# Patient Record
Sex: FEMALE | Race: WHITE | Employment: OTHER | URBAN - METROPOLITAN AREA
[De-identification: names, ages, dates, MRNs, and addresses within clinical notes are randomized per-mention and may not be internally consistent; named-entity substitution may affect disease eponyms.]

---

## 2018-05-14 ENCOUNTER — HOSPITAL ENCOUNTER (OUTPATIENT)
Dept: PHYSICAL THERAPY | Age: 78
Discharge: HOME | End: 2018-05-14
Payer: MEDICARE

## 2018-05-14 PROCEDURE — G8985 CARRY GOAL STATUS: HCPCS

## 2018-05-14 PROCEDURE — G8984 CARRY CURRENT STATUS: HCPCS

## 2018-05-14 PROCEDURE — 97110 THERAPEUTIC EXERCISES: CPT

## 2018-05-14 PROCEDURE — 97162 PT EVAL MOD COMPLEX 30 MIN: CPT

## 2018-05-14 NOTE — PROGRESS NOTES
PHYSICAL THERAPY - DAILY TREATMENT NOTE    Patient Name: Joseph Monsalve        Date: 2018  : 1940   YES Patient  Verified  Visit #:   1   of   8  Insurance: Payor: [place] MEDICARE / Plan: [place] MEDICARE PART A & B / Product Type: Medicare /      In time: 2:20 Out time: 3:05   Total Treatment Time: 39     Medicare Time Tracking (below)   Total Timed Codes (min):  10 1:1 Treatment Time:  10     TREATMENT AREA =  Right UE    SUBJECTIVE    Pain Level (on 0 to 10 scale):  0  / 10   Medication Changes/New allergies or changes in medical history, any new surgeries or procedures? NO    If yes, update Summary List   Subjective Functional Status/Changes:  []  No changes reported     See Eval          OBJECTIVE      10 min Therapeutic Exercise:  [x]  See flow sheet   Rationale:      increase ROM and increase strength to improve the patients ability to perform AROm without pain      min Patient Education:  YES  Reviewed HEP   []  Progressed/Changed HEP based on:   Issued HEP     Other Objective/Functional Measures:    See Eval     Post Treatment Pain Level (on 0 to 10) scale:   0  / 10     ASSESSMENT    Assessment/Changes in Function:     Justification for Eval Code Complexity: moderate  Patient History (low 0, mod 1-2, high 3-4): multiple Anthony, L/S fusion-high, Mastectomy 2018   Examination (low 1-2, mod 3+, high 4+): Decreased strength, poor posturing, limited activity tolerance  Clinical Presentation (low: stable/uncomplicated; mod: evolving; high: unstable/unpredictable): evolving  Clinical Decision Making (low , mod 26-74, high 1-25): FOTO-moderate         []  See Progress Note/Recertification   Patient will continue to benefit from skilled PT services to analyze,, cue,, progress,, modify,, demonstrate,, instruct, and address, movement patterns,, therapeutic interventions,, postural abnormalities,, soft tissue restrictions,, ROM,, strength, and functional mobility, to attain remaining goals.    Progress toward goals / Updated goals:    Goals set per POC     PLAN    []  Upgrade activities as tolerated YES Continue plan of care   []  Discharge due to :    []  Other:      Therapist: Patrick Portillo DPT    Date: 5/14/2018 Time: 4:12 PM   Future Appointments  Date Time Provider Maribel House   5/17/2018 1:00 PM [facility]   5/21/2018 2:00  [facility]   5/23/2018 1:00 PM [facility]   5/30/2018 2:00 PM [facility]   6/1/2018 1:30 PM [facility]

## 2018-05-14 NOTE — PROGRESS NOTES
[address] PHYSICAL THERAPY  [address] [place] [address] Mario Toribio, [address] - Phone: [phone]  Fax: [phone] / [facility]  Patient Name: Kimmy Ernandez : 1940   Medical   Diagnosis: Pain in right arm [M79.601] Treatment Diagnosis: Right shd pain; impingement   Onset Date: Post mastectomy 2018     Referral Source: Unknown, Provider, MD Start of Care [facility]): 2018   Prior Hospitalization: DOS 2018 Provider #: [id number]   Prior Level of Function: Sedentary, Knitting, COmputer Work   Comorbidities: Hs of L/S fusion, Prior breast Cancer with Radiation , Bladder CA with Chemo , Incontinence   Medications: Verified on Patient Summary List   The Plan of Care and following information is based on the information from the initial evaluation.   ==========================================================================================  Assessment / key information:  Pt is a 69year old female who presents to PT today with c/o interm right shd/upper arm pain since undergoing right mastectomy on 2018 with axillary node resection. Pt reports Dx in Dec 2017 with surgery on . No radiation or chemo required and pt reports she was not a candidate for reconstruction due to tissue integrity from radiation to right breast in . Pt reports she experienced no swelling in her right UE however MD would liek her fit for a preventative compression sleeve to kristopher while flying. C/C at this time is right upper arm pain with AROM elevation and end range ER. S/S consistent with right shd impingement is pt demo full right AROM with end range pain, pain free PROM, and (+) Terie Grumbles. Pt was educated today on importance of posturing and glenohumeral mechanics to decrease stress on joint. Noted pt with slumped and protracted scapula Right>left.  Pt reports history of mild infection at inferior incision with noted decreased tissue mobility. Pt demo left UE strength grossly 4+/5. Right UE MMT flex and abd 4/5 with mild discomfort, Mid trap and IR 4/5 as well. Pt would benefit from skilled PT to address increased pain, decreased strength and limited activity tolerance.    ==========================================================================================  Eval Complexity: History: HIGH Complexity :3+ comorbidities / personal factors will impact the outcome/ POC Exam:MEDIUM Complexity : 3 Standardized tests and measures addressing body structure, function, activity limitation and / or participation in recreation  Presentation: MEDIUM Complexity : Evolving with changing characteristics  Clinical Decision Making:MEDIUM Complexity : FOTO score of 26-74Overall Complexity:MEDIUM    Problem List: pain affecting function, decrease strength, decrease ADL/ functional abilitiies, decrease activity tolerance, decrease flexibility/ joint mobility and decrease transfer abilities   Treatment Plan may include any combination of the following: Therapeutic exercise, Therapeutic activities, Neuromuscular re-education, Physical agent/modality, Manual therapy, Aquatic therapy, Patient education, Self Care training and Functional mobility training  Patient / Family readiness to learn indicated by: asking questions, trying to perform skills and interest  Persons(s) to be included in education: patient (P)  Barriers to Learning/Limitations: None  Measures taken: FOTO score 69 indicating 31% functional disability    Patient Goal (s): \"I don't want to lose use of my arm\"   Patient self reported health status: good  Rehabilitation Potential: excellent   Short Term Goals: To be accomplished in  2  weeks:  1. Pt will be compliant with HEP for symptom management at home. 2. Pt will demo pain free overhead elevation for increased ease reaching into a cabinet.  Long Term Goals: To be accomplished in  4  weeks:  1.  Pt will be independent with HEP at D/C for self management. 2. Pt will demo right shd flex MMT >/= 4+/5 without pain for increased ease performing chores at home. 3. Pt will increase FS FOTO Score to >/= 74 to indicate a significant decrease in functional disability. Frequency / Duration:   Patient to be seen  2  times per week for 4  weeks:  Patient / Caregiver education and instruction: self care, activity modification and exercises  G-Codes (GP): Carry [id number] Current  CJ= 20-39%    Goal  CJ= 20-39%. The severity rating is based on the FOTO Score    Therapist Signature: Lucinda Montoya DPT Date: 6/19/1413   Certification Period: 5/14/18-8/13/18 Time: 4:13 PM   ===========================================================================================  I certify that the above Physical Therapy Services are being furnished while the patient is under my care. I agree with the treatment plan and certify that this therapy is necessary. Physician Signature:        Date:       Time:     Please sign and return to [employer] or you may fax the signed copy to [phone]. Thank you.

## 2018-05-21 ENCOUNTER — HOSPITAL ENCOUNTER (OUTPATIENT)
Dept: PHYSICAL THERAPY | Age: 78
Discharge: HOME | End: 2018-05-21
Payer: MEDICARE

## 2018-05-21 PROCEDURE — 97110 THERAPEUTIC EXERCISES: CPT

## 2018-05-21 NOTE — PROGRESS NOTES
PHYSICAL THERAPY - DAILY TREATMENT NOTE    Patient Name: George Peña        Date: 2018  : 1940   YES Patient  Verified  Visit #:   2   of   8  Insurance: Payor: [place] MEDICARE / Plan: VA MEDICARE PART A & B / Product Type: Medicare /      In time: 2:00 Out time: 2:40   Total Treatment Time: 40     Medicare Time Tracking (below)   Total Timed Codes (min):  40 1:1 Treatment Time:  40     TREATMENT AREA =  right UE     SUBJECTIVE    Pain Level (on 0 to 10 scale):  0  / 10   Medication Changes/New allergies or changes in medical history, any new surgeries or procedures? NO    If yes, update Summary List   Subjective Functional Status/Changes:  []  No changes reported      Pt reports minimal soreness in her right shoulder/arm after her initial evaluation. Pt states that she has been doing the exercises at home, but \"not sure if I'm doing it exactly right. \"    Pt reports that she is going to a chiropractor to work on her posture and alignment. OBJECTIVE    40 min Therapeutic Exercise:  [x]  See flow sheet   Rationale:    Increase shoulder/scapular strength/stabilization and ROM to improve functional mobility of UE and allow for increased ease with ADL's     min Patient Education:  YES  Reviewed HEP   [x]  Progressed/Changed HEP based on:   Reviewed form for HEP and added C/S ex, see copy in chart. Other Objective/Functional Measures:    Pt c/o intermittent pain in L>R UT with performing tband stabilization exercises and SA punches. Added UT stretch and repeated C/S retractions. Mod VCing for form and to prevent compensatory ms guarding and trunk leaning. Post Treatment Pain Level (on 0 to 10) scale:   0  / 10     ASSESSMENT    Assessment/Changes in Function:     Pt with good tolerance to progression of therapeutic exercise.       []  See Progress Note/Recertification   Patient will continue to benefit from skilled PT services to modify and progress therapeutic interventions, address functional mobility deficits, address ROM deficits, address strength deficits, analyze and address soft tissue restrictions, analyze and cue movement patterns and assess and modify postural abnormalities to attain remaining goals. Progress toward goals / Updated goals: · Short Term Goals: To be accomplished in  2  weeks:  1. Pt will be compliant with HEP for symptom management at home. Pt reports compliance with HEP  2. Pt will demo pain free overhead elevation for increased ease reaching into a cabinet.      PLAN    []  Upgrade activities as tolerated YES Continue plan of care   []  Discharge due to :    []  Other:      Therapist: Jimbo Shaw PTA    Date: 5/21/2018 Time: 2:09 PM     Future Appointments  Date Time Provider Maribel House   5/23/2018 1:00 PM [facility]   5/30/2018 2:00 PM [address] [place] [address]   6/1/2018 1:30 PM [address] [place] [address]

## 2018-05-23 ENCOUNTER — HOSPITAL ENCOUNTER (OUTPATIENT)
Dept: PHYSICAL THERAPY | Age: 78
Discharge: HOME | End: 2018-05-23
Payer: MEDICARE

## 2018-05-23 PROCEDURE — 97110 THERAPEUTIC EXERCISES: CPT

## 2018-05-23 PROCEDURE — 97140 MANUAL THERAPY 1/> REGIONS: CPT

## 2018-05-23 NOTE — PROGRESS NOTES
PHYSICAL THERAPY - DAILY TREATMENT NOTE    Patient Name: Dina Timmons        Date: 2018  : 1940   YES Patient  Verified  Visit #:    3  of   8  Insurance: Payor: Brando Liang / Plan: VA MEDICARE PART A & B / Product Type: Medicare /      In time: 1:15 Out time: 2:15   Total Treatment Time: 60     Medicare Time Tracking (below)   Total Timed Codes (min):  60 1:1 Treatment Time:  60     TREATMENT AREA =  Pain in right arm [M79.601]    SUBJECTIVE    Pain Level (on 0 to 10 scale):  0  / 10   Medication Changes/New allergies or changes in medical history, any new surgeries or procedures? NO     If yes, update Summary List   Subjective Functional Status/Changes:  []  No changes reported     \"I have pain when I lift my shoulder. It hurts in my neck, and the top of my back and the front of my shoulder. \"          OBJECTIVE    20 min Therapeutic Exercise:  [x]  See flow sheet   Rationale:      increase ROM and increase strength to improve the patients ability to perform ADL's with improved periscapular stability. 40 min Manual Therapy: Demonstration of self-MLD, MLD to R UE   Rationale:      decrease pain, increase ROM and increase tissue extensibility to improve patient's ability to prevent onset of lymphedema during an [place] flight.     min Patient Education:  YES  Reviewed HEP   []  Progressed/Changed HEP based on:   Patient reports compliance     Other Objective/Functional Measures:    Pt was able to demonstrate self-MLD after instruction. Pt has pain with end-range shoulder flexion/scaption. Was able to correct UE lifting mechaics and flex the shoulder without pain. Post Treatment Pain Level (on 0 to 10) scale:   0  / 10     ASSESSMENT    Assessment/Changes in Function:     Pt presents to PT progressing in AROM tolerance with periscapular strengthening. Will prescribe lymphedema compression garment in the coming visits.      []  See Progress Note/Recertification   Patient will continue to benefit from skilled PT services to modify and progress therapeutic interventions, address functional mobility deficits, address ROM deficits, address strength deficits, analyze and address soft tissue restrictions, analyze and cue movement patterns, analyze and modify body mechanics/ergonomics and assess and modify postural abnormalities to attain remaining goals. Progress toward goals / Updated goals: Addressed STG 2 with shoulder flexion AROM.      PLAN    [x]  Upgrade activities as tolerated YES Continue plan of care   []  Discharge due to :    []  Other:      Therapist: Cecilia Linda    Date: 5/23/2018 Time: 2:35 PM     Future Appointments  Date Time Provider Maribel House   5/25/2018 1:30 PM [address] [place] [address]   5/30/2018 7:30 AM [facility] [place] [address] [place] [address]   6/1/2018 9:00 AM [facility]   6/4/2018 8:30 AM [facility]   6/7/2018 1:30 PM [facility]   6/11/2018 8:00 AM [facility]   6/14/2018 1:30 PM [facility]   6/18/2018 8:00 AM [facility]   6/22/2018 11:00 AM [facility]   6/25/2018 8:00 AM [facility]   6/28/2018 1:30 PM [facility]

## 2018-05-25 ENCOUNTER — HOSPITAL ENCOUNTER (OUTPATIENT)
Dept: PHYSICAL THERAPY | Age: 78
Discharge: HOME | End: 2018-05-25
Payer: MEDICARE

## 2018-05-25 PROCEDURE — 97110 THERAPEUTIC EXERCISES: CPT

## 2018-05-25 PROCEDURE — 97140 MANUAL THERAPY 1/> REGIONS: CPT

## 2018-05-25 NOTE — PROGRESS NOTES
PHYSICAL THERAPY - DAILY TREATMENT NOTE    Patient Name: Jimbo Reid        Date: 2018  : 1940   YES Patient  Verified  Visit #:   4   of   8  Insurance: Payor: Epifanio Fraction / Plan: VA MEDICARE PART A & B / Product Type: Medicare /      In time: 1:30 Out time: 2:10   Total Treatment Time: 40     Medicare Time Tracking (below)   Total Timed Codes (min):  40 1:1 Treatment Time:  40     TREATMENT AREA =  Pain in right arm [M79.601]    SUBJECTIVE    Pain Level (on 0 to 10 scale):  0  / 10   Medication Changes/New allergies or changes in medical history, any new surgeries or procedures? NO    If yes, update Summary List   Subjective Functional Status/Changes:  []  No changes reported     Pt reports being a little sore after last session. Pt states that she liked the tape and was a good reminder for her posture. OBJECTIVE    30 min Therapeutic Exercise:  [x]  See flow sheet   Rationale:   Increase shoulder/scapular strength/stabilization and ROM to improve functional mobility of UE and allow for increased ease with ADL's    10 min Manual Therapy: KT: facilitation of scap retraction for postural awareness, UT inhibition and deltoid facilitation right shd   Rationale: facilitate normalized shoulder mechanics to decrease impingement      min Patient Education:  YES  Reviewed HEP   []  Progressed/Changed HEP based on: Other Objective/Functional Measures:    Pt c/o intermittent ms tightness in right UT with therapeutic exercise. Mod VCing and TCing to decrease compensatory ms guarding with tband ex. Pt reported decreased intensity of right shoulder pain with elevation after KT.       Post Treatment Pain Level (on 0 to 10) scale:   0  / 10     ASSESSMENT    Assessment/Changes in Function:     Pt responding well to KT.      []  See Progress Note/Recertification   Patient will continue to benefit from skilled PT services to modify and progress therapeutic interventions, address functional mobility deficits, address ROM deficits, address strength deficits, analyze and address soft tissue restrictions, analyze and cue movement patterns and assess and modify postural abnormalities to attain remaining goals. Progress toward goals / Updated goals: · Short Term Goals: To be accomplished in  2  weeks:  1. Pt will be compliant with HEP for symptom management at home. Pt reports compliance with HEP  2. Pt will demo pain free overhead elevation for increased ease reaching into a cabinet. decreased intensity of right shoulder pain with elevation after KT.      PLAN    []  Upgrade activities as tolerated YES Continue plan of care   []  Discharge due to :    []  Other:      Therapist: Padilla Ivory PTA    Date: 5/25/2018 Time: 1:47 PM     Future Appointments  Date Time Provider Maribel House   5/30/2018 7:30 AM [address] [place] [address]   6/1/2018 9:00 AM [facility] [place] [address] [place] [address]   6/4/2018 8:30 AM [address] [place] [address]   6/7/2018 1:30 PM [facility]   6/11/2018 8:00 AM [facility]   6/14/2018 1:30 PM [facility]   6/18/2018 8:00 AM [facility]   6/22/2018 11:00 AM [facility]   6/25/2018 8:00 AM [facility]   6/28/2018 1:30 PM [facility]

## 2018-05-30 ENCOUNTER — APPOINTMENT (OUTPATIENT)
Dept: PHYSICAL THERAPY | Age: 78
End: 2018-05-30
Payer: MEDICARE

## 2018-05-30 ENCOUNTER — HOSPITAL ENCOUNTER (OUTPATIENT)
Dept: PHYSICAL THERAPY | Age: 78
Discharge: HOME | End: 2018-05-30
Payer: MEDICARE

## 2018-05-30 PROCEDURE — 97140 MANUAL THERAPY 1/> REGIONS: CPT

## 2018-05-30 PROCEDURE — 97110 THERAPEUTIC EXERCISES: CPT

## 2018-05-30 NOTE — PROGRESS NOTES
PHYSICAL THERAPY - DAILY TREATMENT NOTE    Patient Name: Chetan Wu        Date: 2018  : 1940   YES Patient  Verified  Visit #:   5   of   8  Insurance: Payor: Mary Haddad / Plan: VA MEDICARE PART A & B / Product Type: Medicare /      In time: 7:30 Out time: 8:30   Total Treatment Time: 60     Medicare Time Tracking (below)   Total Timed Codes (min):  60 1:1 Treatment Time:  38     TREATMENT AREA =  Pain in right arm [M79.296]    SUBJECTIVE    Pain Level (on 0 to 10 scale):  0  / 10   Medication Changes/New allergies or changes in medical history, any new surgeries or procedures? NO     If yes, update Summary List   Subjective Functional Status/Changes:  []  No changes reported     \"I have a constant pain in my arm. Even when it's resting. The tape helps me to sit up straight though. \"          OBJECTIVE    Modalities Rationale:      decrease inflammation and decrease pain to improve patient's ability to lift UE with improved tolerance. min [] Estim, type/location:                                      []  att     []  unatt     []  w/US     []  w/ice    []  w/heat    min []  Mechanical Traction: type/lbs                   []  pro   []  sup   []  int   []  cont    []  before manual    []  after manual    min []  Ultrasound, settings/location:      min []  Iontophoresis w/ dexamethasone, location:                                               []  take home patch       []  in clinic   10 min [x]  Ice     []  Heat    location/position: R UE    min []  Vasopneumatic Device, press/temp:     min []  Other:    [x] Skin assessment post-treatment (if applicable):   [x]  intact    []  redness- no adverse reaction     []redness - adverse reaction:    25 min Therapeutic Exercise:  [x]  See flow sheet   Rationale:      increase ROM and increase strength to improve the patients ability to perform ADL's with improved periscapular stability.      35 min Manual Therapy: Application of kinesiotape to periscapular border, circumferential measurement for garment fitting   Rationale:      decrease pain and increase ROM to improve patient's ability to sit with appropriate upright posture independently and to prevent onset of lymphedema. min Patient Education:  YES  Reviewed HEP   []  Progressed/Changed HEP based on:   Patient reports compliance     Other Objective/Functional Measures:    Pt still requires constant vc's to perform UE elevation without IR and rounded shoulders. Pt is challenged by prone stabilization exercises without pain. Post Treatment Pain Level (on 0 to 10) scale:   0  / 10     ASSESSMENT    Assessment/Changes in Function:     Pt is progressing in tolerance for scapular stability exercises and was fitted for a prophylatic sleeve to prevent lymphedema. WIll practice donning garment when garment arrives. []  See Progress Note/Recertification   Patient will continue to benefit from skilled PT services to modify and progress therapeutic interventions, address functional mobility deficits, address ROM deficits, address strength deficits, analyze and address soft tissue restrictions, analyze and cue movement patterns, analyze and modify body mechanics/ergonomics and assess and modify postural abnormalities to attain remaining goals. Progress toward goals / Updated goals: Addressed STG 2 with prone exercises.      PLAN    [x]  Upgrade activities as tolerated YES Continue plan of care   []  Discharge due to :    []  Other:      Therapist: John Rausch    Date: 5/30/2018 Time: 9:10 AM     Future Appointments  Date Time Provider Maribel House   6/1/2018 9:00 AM [address] [place] [address]   6/4/2018 8:30 AM [address] [place] [address]   6/7/2018 1:30 PM Karen Wang [facility]   6/11/2018 8:00 AM Karen Simms [facility]   6/14/2018 1:30 PM [facility]   6/22/2018 11:[facility]   6/25/2018 8:00 AM [facility]   6/28/2018 1:30 PM Annalise [facility]

## 2018-06-01 ENCOUNTER — APPOINTMENT (OUTPATIENT)
Dept: PHYSICAL THERAPY | Age: 78
End: 2018-06-01
Payer: MEDICARE

## 2018-06-01 ENCOUNTER — HOSPITAL ENCOUNTER (OUTPATIENT)
Dept: PHYSICAL THERAPY | Age: 78
Discharge: HOME | End: 2018-06-01
Payer: MEDICARE

## 2018-06-01 PROCEDURE — 97110 THERAPEUTIC EXERCISES: CPT

## 2018-06-01 NOTE — PROGRESS NOTES
PHYSICAL THERAPY - DAILY TREATMENT NOTE    Patient Name: Chula Carr        Date: 2018  : 1940   YES Patient  Verified  Visit #:   6   of   8  Insurance: Payor: Brittney Leon / Plan: [place] MEDICARE PART A & B / Product Type: Medicare /      In time: 9:00 Out time: 10:10   Total Treatment Time: 70     Medicare Time Tracking (below)   Total Timed Codes (min):  70 1:1 Treatment Time:  70     TREATMENT AREA =  Pain in right arm [M79.601]    SUBJECTIVE    Pain Level (on 0 to 10 scale):  0  / 10   Medication Changes/New allergies or changes in medical history, any new surgeries or procedures? NO     If yes, update Summary List   Subjective Functional Status/Changes:  []  No changes reported     \"My arm still feels the same. It hurts in certain positions but it doesn't hurt most of the time. \"          OBJECTIVE    60 min Therapeutic Exercise:  [x]  See flow sheet   Rationale:      increase ROM and increase strength to improve the patients ability to perform ADL's with improved periscapular mobility and stability. 10 min Manual Therapy: cirucmferential measurement of contalateral UE as a baseline to track UE edema   Rationale: To prevent loss of AROM. min Patient Education:  YES  Reviewed HEP   []  Progressed/Changed HEP based on:   Patient reports compliance     Other Objective/Functional Measures:    Pt experiences symptoms with GH extension AROM. Pt symptoms improved with cueing to perform with appropriate posterior tilt and scapular retraction. Pt performed exercises for 30-40 minutes without symptoms. However, a vertebral artery test was performed to assure safety with performing C/S manual retractions. Pt demonstrated no symptoms on the modified vertebral A test. After one retraction the pt reported nauseousness and dizziness similar to an episode of BPPV which was treated with the Eply maneuver 2 years ago. The pt sat on the EOB and reported her symptoms had abolished.  However, after standing for 10 minutes performing rows and extension the pt reported her symptoms had returned and were rapidly worsening. The pt's vitals were measured: 120/80 mmHg BP, 60 bpm HR, 100% O2 sats. The pt was advised to have her daughter pick her up and take her to the hospital or EMS would be called. The pt became distressed and asked the PT not to call her family or 911. The pt was advised again to be cleared by a physician when she left to go home on her own. Post Treatment Pain Level (on 0 to 10) scale:   0  / 10     ASSESSMENT    Assessment/Changes in Function:     Pt presents to PT able to perform exercises without pain. Pt's activity tolerance limited by medical complications to be evaluated before returning to PT.     []  See Progress Note/Recertification   Patient will continue to benefit from skilled PT services to modify and progress therapeutic interventions, address functional mobility deficits, address ROM deficits, address strength deficits, analyze and address soft tissue restrictions, analyze and cue movement patterns, analyze and modify body mechanics/ergonomics and assess and modify postural abnormalities to attain remaining goals. Progress toward goals / Updated goals: Addressed flexion MMT with flexion AROM.      PLAN    [x]  Upgrade activities as tolerated YES Continue plan of care   []  Discharge due to :    []  Other:      Therapist: Alisa Mclaughlin    Date: 6/1/2018 Time: 1:20 PM     Future Appointments  Date Time Provider Maribel House   6/4/2018 8:30 AM [address] [place] [address]   6/7/2018 1:30 PM Bianca Hernandez [facility]   6[facility]11[facility]2018 [facility]   6/14/2018 1:30 PM [facility]   6/22/2018 11:00 AM Bianca Hernandez [facility]   6/25/2018 8:00 AM [facility]   6/28/2018 1:30 PM [facility]

## 2018-06-04 ENCOUNTER — HOSPITAL ENCOUNTER (OUTPATIENT)
Dept: PHYSICAL THERAPY | Age: 78
Discharge: HOME | End: 2018-06-04
Payer: MEDICARE

## 2018-06-04 PROCEDURE — 97110 THERAPEUTIC EXERCISES: CPT

## 2018-06-04 NOTE — PROGRESS NOTES
PHYSICAL THERAPY - DAILY TREATMENT NOTE    Patient Name: George Peña        Date: 2018  : 1940   YES Patient  Verified  Visit #:     Insurance: Payor: Ab Buitrago / Plan: [place] MEDICARE PART A & B / Product Type: Medicare /      In time: 8:30 Out time: 9:10   Total Treatment Time: 40     Medicare Time Tracking (below)   Total Timed Codes (min):  40 1:1 Treatment Time:  40     TREATMENT AREA =  Pain in right arm [M79.601]    SUBJECTIVE    Pain Level (on 0 to 10 scale):  0  / 10   Medication Changes/New allergies or changes in medical history, any new surgeries or procedures? NO     If yes, update Summary List   Subjective Functional Status/Changes:  []  No changes reported     \"I feel better. I tried to eliminate turning my arm inwards. I also don't want to do anything with my neck anymore just incase. After my last visit I went home and was okay after I took a nap. But I just want to be safe. \"          OBJECTIVE    40 min Therapeutic Exercise:  [x]  See flow sheet   Rationale:      increase ROM and increase strength to improve the patients ability to perform ADL's with improved periscapular stability with OH ADL's.      min Patient Education:  YES  Reviewed HEP   []  Progressed/Changed HEP based on:   Patient reports compliance     Other Objective/Functional Measures:    Pt was able to perform OH motions in SL without pain and verbal cues to perform with appropriate scapular retraction. Initiated wall taps without symptoms. Post Treatment Pain Level (on 0 to 10) scale:   0  / 10     ASSESSMENT    Assessment/Changes in Function:     Pt presents to PT progressing appropriately in persicapular strengthening program. Awaiting garment to be fitted and used during future visits.      []  See Progress Note/Recertification   Patient will continue to benefit from skilled PT services to modify and progress therapeutic interventions, address functional mobility deficits, address ROM deficits, address strength deficits, analyze and address soft tissue restrictions, analyze and cue movement patterns, analyze and modify body mechanics/ergonomics and assess and modify postural abnormalities to attain remaining goals. Progress toward goals / Updated goals: Addressed LTG 2 with SL AROM with resistance.      PLAN    [x]  Upgrade activities as tolerated YES Continue plan of care   []  Discharge due to :    []  Other:      Therapist: Cookie Ruiz    Date: 6/4/2018 Time: 12:49 PM     Future Appointments  Date Time Provider Maribel House   6/7/2018 1:30 PM [address] [place] [address] Drive   6/11/2018 8:00 AM [address]   6/14/2018 1:30 PM [address]   6/22/2018 11:00 AM [address]   6/25/2018 8:00 AM [address]   6/28/2018 1:30 PM [address]

## 2018-06-07 ENCOUNTER — HOSPITAL ENCOUNTER (OUTPATIENT)
Dept: PHYSICAL THERAPY | Age: 78
Discharge: HOME | End: 2018-06-07
Payer: MEDICARE

## 2018-06-07 PROCEDURE — 97140 MANUAL THERAPY 1/> REGIONS: CPT

## 2018-06-07 PROCEDURE — 97110 THERAPEUTIC EXERCISES: CPT

## 2018-06-07 NOTE — PROGRESS NOTES
PHYSICAL THERAPY - DAILY TREATMENT NOTE    Patient Name: Sienna Anderson        Date: 2018  : 1940   YES Patient  Verified  Visit #:   8   of   8+  Insurance: Payor: Jordon Chaudhry / Plan: [place] MEDICARE PART A & B / Product Type: Medicare /      In time: 1:38 Out time: 2:25   Total Treatment Time: 52     Medicare Time Tracking (below)   Total Timed Codes (min):  47 1:1 Treatment Time:  38     TREATMENT AREA =  Pain in right arm [M79.601]    SUBJECTIVE    Pain Level (on 0 to 10 scale):  0  / 10   Medication Changes/New allergies or changes in medical history, any new surgeries or procedures? NO     If yes, update Summary List   Subjective Functional Status/Changes:  []  No changes reported     \"I ordered my garment. I'm waiting for insurance  I'm also going to the chiropractor. She's doing something in the back of my shoulder. If I'm not in the wrong position it doesn't bother me. \"          OBJECTIVE    37 (28) min Therapeutic Exercise:  [x]  See flow sheet   Rationale:      increase ROM and increase strength to improve the patients ability to perform ADL's with improved periscapular stability. 10 min Manual Therapy: Inferior grader 3-4 glenohumeral mobilizations, abduction PROM   Rationale:      decrease pain, increase ROM and increase tissue extensibility to improve patient's ability to perform OH ADL's with improved AROM. min Patient Education:  YES  Reviewed HEP   []  Progressed/Changed HEP based on:   Patient reports compliance     Other Objective/Functional Measures:    Pt demonstrates crepitus and pain with full abduction AROM. Pt symptoms and crepitus decrease after manual interventions. Pt can perform full AROM without pain. Post Treatment Pain Level (on 0 to 10) scale:   0  / 10     ASSESSMENT    Assessment/Changes in Function:     Pt presents to PT with improving tolerance for full abduction PROM. Will reassess next visit.      []  See Progress Note/Recertification   Patient will continue to benefit from skilled PT services to modify and progress therapeutic interventions, address functional mobility deficits, address ROM deficits, address strength deficits, analyze and address soft tissue restrictions, analyze and cue movement patterns and analyze and modify body mechanics/ergonomics to attain remaining goals. Progress toward goals / Updated goals: Addressed AROM with manual inerventions.      PLAN    [x]  Upgrade activities as tolerated YES Continue plan of care   []  Discharge due to :    []  Other:      Therapist: Erika Grimes    Date: 6/7/2018 Time: 1:45 PM     Future Appointments  Date Time Provider Maribel House   6/11/2018 8:00 AM [address] [place] [address] [address]   6/14/2018 1:30 PM [place] [address]   6/22/2018 11:00 AM Delta Andres [address]   6/25/2018 8:00 AM [address]   6/28/2018 1:30 PM [address]

## 2018-06-11 ENCOUNTER — HOSPITAL ENCOUNTER (OUTPATIENT)
Dept: PHYSICAL THERAPY | Age: 78
Discharge: HOME | End: 2018-06-11
Payer: MEDICARE

## 2018-06-11 PROCEDURE — 97110 THERAPEUTIC EXERCISES: CPT

## 2018-06-11 PROCEDURE — 97140 MANUAL THERAPY 1/> REGIONS: CPT

## 2018-06-11 PROCEDURE — G8985 CARRY GOAL STATUS: HCPCS

## 2018-06-11 PROCEDURE — G8984 CARRY CURRENT STATUS: HCPCS

## 2018-06-11 NOTE — PROGRESS NOTES
PHYSICAL THERAPY - DAILY TREATMENT NOTE    Patient Name: Binh Pratt        Date: 2018  : 1940   YES Patient  Verified  Visit #:   +  Insurance: Payor: Murali Staff / Plan: [place] MEDICARE PART A & B / Product Type: Medicare /      In time: 8:05 Out time: 9:00   Total Treatment Time: 55     Medicare Time Tracking (below)   Total Timed Codes (min):  45 1:1 Treatment Time:  25     TREATMENT AREA =  Pain in right arm [M79.601]    SUBJECTIVE    Pain Level (on 0 to 10 scale):  0  / 10   Medication Changes/New allergies or changes in medical history, any new surgeries or procedures? NO     If yes, update Summary List   Subjective Functional Status/Changes:  []  No changes reported     \"It only hurts when I lift my arm. A 2-3/10\"          OBJECTIVE    Modalities Rationale:      decrease inflammation and decrease pain to improve patient's ability to perform ADL's with improved activity tolerance. min [] Estim, type/location:                                      []  att     []  unatt     []  w/US     []  w/ice    []  w/heat    min []  Mechanical Traction: type/lbs                   []  pro   []  sup   []  int   []  cont    []  before manual    []  after manual    min []  Ultrasound, settings/location:      min []  Iontophoresis w/ dexamethasone, location:                                               []  take home patch       []  in clinic   10 min [x]  Ice     []  Heat    location/position: Short-sit, L shoulder    min []  Vasopneumatic Device, press/temp:     min []  Other:    [x] Skin assessment post-treatment (if applicable):   [x]  intact    []  redness- no adverse reaction     []redness - adverse reaction:    17 (37) min Therapeutic Exercise:  [x]  See flow sheet   Rationale:      increase ROM and increase strength to improve the patients ability to perform ADL's with improved periscapular stability.      8 min Manual Therapy: Grade 3 inferior and posterior GH mob's   Rationale: decrease pain, increase ROM and increase tissue extensibility to improve patient's ability to perform ADL's with improved AROM tolerance. min Patient Education:  YES  Reviewed HEP   []  Progressed/Changed HEP based on:   Patient reports compliance     Other Objective/Functional Measures:    See progress note. Pt still requires cues to lift arm with appropriate mechanics. Post Treatment Pain Level (on 0 to 10) scale:   0  / 10     ASSESSMENT    Assessment/Changes in Function:     See progress note. []  See Progress Note/Recertification   Patient will continue to benefit from skilled PT services to modify and progress therapeutic interventions, address functional mobility deficits, address ROM deficits, address strength deficits, analyze and address soft tissue restrictions, analyze and cue movement patterns, analyze and modify body mechanics/ergonomics and assess and modify postural abnormalities to attain remaining goals. Progress toward goals / Updated goals:    See progress note.      PLAN    [x]  Upgrade activities as tolerated YES Continue plan of care   []  Discharge due to :    []  Other:      Therapist: Geovanna Zavala    Date: 6/11/2018 Time: 8:10 AM     Future Appointments  Date Time Provider Maribel House   6/14/2018 1:30 PM [facility]   6/22/2018 11:00 AM [facility]   6/25/2018 8:00 AM [facility]   6/28/2018 1:30 PM [facility]

## 2018-06-11 NOTE — PROGRESS NOTES
[facility] PHYSICAL THERAPY  [address] [place] [address] Kelly Toribio, [address] [employer] [address] - Phone: [phone]  Fax: [phone] OF CARE/RECERTIFICATION FOR PHYSICAL THERAPY          Patient Name: Dina Timmons : 1940   Treatment/Medical Diagnosis: Pain in right arm [M79.601]   Onset Date: Mastectomy 2018    Referral Source: Em Her MD [facility]   Prior Hospitalization: See Medical History Provider #: [id number]   Prior Level of Function: Sedentary, Knitting, COmputer Work   Comorbidities: Hs of L/S fusion, Prior breast Cancer with Radiation , Bladder CA with Chemo , Incontinence   Medications: Verified on Patient Summary List   Vis[facility] from [facility] [place] [facility]": 9 Missed Visits: 0     Goal/Measure of Progress Goal Met? 1. Pt will be independent with HEP at D/C for self management. Status at last Eval: NA Current Status: progressing progressing   2. Pt will increase FS FOTO Score to >/= 74 to indicate a significant decrease in functional disability. Status at last Eval: NA Current Status: progressing Progressing   3. Pt will demo pain free overhead elevation for increased ease reaching into a cabinet. Status at last Eval: /10 poor mechanics Current Status: 1/10 improving mechanics Progressing   4. Pt will demo right shd flex MMT >/= 4+/5 without pain for increased ease performing chores at home. Status at last Eval: 4/5 Current Status: 4/5 no     Therapy has consisted of prescription and fitting for a prophylactic compression garment to prevent lymphedema [place] in addition to an exercise program including periscapular strengthening and education on appropriate mechanics with OH ADL's. Key Functional Changes/Progress: Pt's FOTO score improved to 71 indicating 29% limitation in functional ability.  The pt is now able to lift her arm without pain when using appropriate mechanics; however, the pt requires constant verbal cues to perform exercises with appropriate lifting mechanics. The pt reports continued pain and difficulty with lifting her R UE but can perform pain-free in the clinic with appropriate cueing. The pt does demonstrate audible crepitus with PROM. PROM does decrease symptoms in addition to exercise performance. Pt has made poor progress in strengthening due to pain; however, strengthening in a pain-free range will remain a primary focus for the remainder of rehab. Problem List: pain affecting function, decrease ROM, decrease strength, decrease ADL/ functional abilitiies and decrease activity tolerance   Treatment Plan may include any combination of the following: Therapeutic exercise, Therapeutic activities, Neuromuscular re-education, Physical agent/modality, Manual therapy, Patient education and Self Care training  Patient Goal(s) has been updated and includes: \"To remember how to lift my arm correctly. \"    Goals for this certification period include and are to be achieved in   3-4  weeks:  1. Continue all goals above. Frequency / Duration:   Patient to be seen   1-2   times per week for   3-4    weeks:  G-Codes (GP): Carry: [id number] Current  CJ= 20-39%    Goal  CJ= 20-39%. The severity rating is based on the FOTO Score      Assessments/Recommendations: The patient would continue to benefit from skilled interventions to address the above mentioned functional deficits. See above for more details. If you have any questions/comments please contact us directly at [phone]. Thank you for allowing us to assist in the care of your patient. Therapist Signature: Sarah Ruiz DPT Date: 5/74/8233   Certification Period:  Reporting Period: 5/14/18-8/13/18 5/14/18 - 6/11/18 Time: 8:11 AM   NOTE TO PHYSICIAN:  PLEASE COMPLETE THE ORDERS BELOW AND FAX TO   [facility] Physical Therapy: [phone].   If you are unable to process this request in 24 hours please contact our office: [phone].    ___ I have read the above report and request that my patient continue as recommended.   ___ I have read the above report and request that my patient continue therapy with the following changes/special instructions: ________________________________________________   ___ I have read the above report and request that my patient be discharged from therapy.      Physician Signature:        Date:       Time:

## 2018-06-14 ENCOUNTER — HOSPITAL ENCOUNTER (OUTPATIENT)
Dept: PHYSICAL THERAPY | Age: 78
Discharge: HOME | End: 2018-06-14
Payer: MEDICARE

## 2018-06-14 PROCEDURE — 97110 THERAPEUTIC EXERCISES: CPT

## 2018-06-14 PROCEDURE — 97140 MANUAL THERAPY 1/> REGIONS: CPT

## 2018-06-14 NOTE — PROGRESS NOTES
PHYSICAL THERAPY - DAILY TREATMENT NOTE    Patient Name: Monica Acharya        Date: 2018  : 1940   YES Patient  Verified  Visit #:   10   of   8+  Insurance: Payor: Nura Childs / Plan: VA MEDICARE PART A & B / Product Type: Medicare /      In time: 1:35 Out time: 2:20   Total Treatment Time: 45     Medicare/BCBS Time Tracking (below)   Total Timed Codes (min):  35 1:1 Treatment Time:  25     TREATMENT AREA =  Pain in right arm [M79.601]    SUBJECTIVE    Pain Level (on 0 to 10 scale):  0  / 10   Medication Changes/New allergies or changes in medical history, any new surgeries or procedures? NO     If yes, update Summary List   Subjective Functional Status/Changes:  []  No changes reported     \"I still have pain when I lift to the side. I've been seeing the chiropractor too. She does traction and other things to my arm. \"          OBJECTIVE    Modalities Rationale:      decrease pain to improve patient's ability to lift overhead    min [] Estim, type/location:                                      []  att     []  unatt     []  w/US     []  w/ice    []  w/heat    min []  Mechanical Traction: type/lbs                   []  pro   []  sup   []  int   []  cont    []  before manual    []  after manual    min []  Ultrasound, settings/location:      min []  Iontophoresis w/ dexamethasone, location:                                               []  take home patch       []  in clinic   10 min [x]  Ice     []  Heat    location/position:     min []  Vasopneumatic Device, press/temp:     min []  Other:    [x] Skin assessment post-treatment (if applicable):   [x]  intact    []  redness- no adverse reaction     []redness - adverse reaction:    25 (15) min Therapeutic Exercise:  [x]  See flow sheet   Rationale:      increase ROM to improve the patients ability to perform ADL's with improved periscpular stability. 10 min Manual Therapy: PROM abduction, grade 3-4 inferior distraction.  To improve GH mobility for overhead ADL's.     min Patient Education:  YES  Reviewed HEP   []  Progressed/Changed HEP based on:   Patient reports compliance     Other Objective/Functional Measures:    Pt demonstrates no crepitus this visit with AROM; however, pt consistently has difficulty lifting the R UE into abduction with the appropriate mechanics. Pt still had pain with PROM this visit. Post Treatment Pain Level (on 0 to 10) scale:   0  / 10     ASSESSMENT    Assessment/Changes in Function:     Pt presents to PT with slow progress in abduction AROM; pt however is making good progress with flexion AROM and strengthening. []  See Progress Note/Recertification   Patient will continue to benefit from skilled PT services to modify and progress therapeutic interventions, address functional mobility deficits, address ROM deficits, address strength deficits, analyze and address soft tissue restrictions and analyze and cue movement patterns to attain remaining goals. Progress toward goals / Updated goals: Addressed MMT with supine flexion with 2#.      PLAN    [x]  Upgrade activities as tolerated YES Continue plan of care   []  Discharge due to :    []  Other:      Therapist: Erika Grimes    Date: 6/14/2018 Time: 1:56 PM     Future Appointments  Date Time Provider Maribel House   6/22/2018 11:00 AM [address] [place] [address]   6/25/2018 8:00 AM [facility]   6/28/2018 1:30 PM [facility]

## 2018-06-18 ENCOUNTER — APPOINTMENT (OUTPATIENT)
Dept: PHYSICAL THERAPY | Age: 78
End: 2018-06-18
Payer: MEDICARE

## 2018-06-22 ENCOUNTER — HOSPITAL ENCOUNTER (OUTPATIENT)
Dept: PHYSICAL THERAPY | Age: 78
Discharge: HOME | End: 2018-06-22
Payer: MEDICARE

## 2018-06-22 PROCEDURE — 97140 MANUAL THERAPY 1/> REGIONS: CPT

## 2018-06-22 PROCEDURE — 97110 THERAPEUTIC EXERCISES: CPT

## 2018-06-22 NOTE — PROGRESS NOTES
PHYSICAL THERAPY - DAILY TREATMENT NOTE    Patient Name: Katarina Dupree        Date: 2018  : 1940   YES Patient  Verified  Visit #:      8+  Insurance: Payor: Ting Jason / Plan: VA MEDICARE PART A & B / Product Type: Medicare /      In time: 11:00 Out time: 11:40   Total Treatment Time: 40     Medicare/BCBS Time Tracking (below)   Total Timed Codes (min):  40 1:1 Treatment Time:  40     TREATMENT AREA =  Pain in right arm [M79.601]    SUBJECTIVE    Pain Level (on 0 to 10 scale): 0   / 10   Medication Changes/New allergies or changes in medical history, any new surgeries or procedures? NO     If yes, update Summary List   Subjective Functional Status/Changes:  []  No changes reported     \"It only hurts when I lift my arm out to the side. My doctor said not to do any strengthenig exercises. \"          OBJECTIVE    32 min Therapeutic Exercise:  [x]  See flow sheet   Rationale:      increase ROM and increase strength to improve the patients ability to perform ADL's with improved overhead mechanics and periscapular stability. 8 min Manual Therapy: PROM abduction, grade 3-4 GH inferior distraction   Rationale:      decrease pain, increase ROM and increase tissue extensibility to improve patient's ability to perform overhead ADL's with decreased pain. min Patient Education:  YES  Reviewed HEP   []  Progressed/Changed HEP based on:   Patient reports compliance     Other Objective/Functional Measures:    Pt symptoms with abduction dissipate when pt performs with appropriate cueing and after PROM. Pt still demonstrates crepitus which decreases with PROM. Post Treatment Pain Level (on 0 to 10) scale:   0  / 10     ASSESSMENT    Assessment/Changes in Function:     Pt presents to PT progressing towards pain-free AROM with proper cueing for overhead ADL's. WIll continue to progress per activity tolerance.      []  See Progress Note/Recertification   Patient will continue to benefit from skilled PT services to modify and progress therapeutic interventions, address functional mobility deficits, address ROM deficits, address strength deficits, analyze and address soft tissue restrictions, analyze and cue movement patterns, analyze and modify body mechanics/ergonomics and assess and modify postural abnormalities to attain remaining goals. Progress toward goals / Updated goals: Addressed FOTO score with overhead ADL training.      PLAN    [x]  Upgrade activities as tolerated YES Continue plan of care   []  Discharge due to :    []  Other:      Therapist: Juanita Yu    Date: 6/22/2018 Time: 12:57 PM     Future Appointments  Date Time Provider Maribel House   6/25/2018 8:00 AM [address] [place] [address]   6/28/2018 1:30 PM [address] [place] [address]

## 2018-06-25 ENCOUNTER — HOSPITAL ENCOUNTER (OUTPATIENT)
Dept: PHYSICAL THERAPY | Age: 78
Discharge: HOME | End: 2018-06-25
Payer: MEDICARE

## 2018-06-25 PROCEDURE — 97110 THERAPEUTIC EXERCISES: CPT

## 2018-06-25 NOTE — PROGRESS NOTES
PHYSICAL THERAPY - DAILY TREATMENT NOTE    Patient Name: Mario Alberto Anderson        Date: 2018  : 1940   YES Patient  Verified  Visit #:      8+  Insurance: Payor: Benita Oreilly / Plan: [place] / Product Type: Medicare /      In time: 7:55 Out time: 8:44   Total Treatment Time: 49     Medicare/BCBS Time Tracking (below)   Total Timed Codes (min):  49 1:1 Treatment Time:  49     TREATMENT AREA =  Pain in right arm [M79.601]    SUBJECTIVE    Pain Level (on 0 to 10 scale):  0  / 10   Medication Changes/New allergies or changes in medical history, any new surgeries or procedures? NO     If yes, update Summary List   Subjective Functional Status/Changes:  []  No changes reported     \"I feel the same. My sleeve hopefully comes in this week. \"          OBJECTIVE    Modalities Rationale:      decrease inflammation and decrease pain to improve patient's ability to perform ADL's with improved actiivty tolerance. min [] Estim, type/location:                                      []  att     []  unatt     []  w/US     []  w/ice    []  w/heat    min []  Mechanical Traction: type/lbs                   []  pro   []  sup   []  int   []  cont    []  before manual    []  after manual    min []  Ultrasound, settings/location:      min []  Iontophoresis w/ dexamethasone, location:                                               []  take home patch       []  in clinic   10 min [x]  Ice     []  Heat    location/position: R shoulder, short-sitting    min []  Vasopneumatic Device, press/temp:     min []  Other:    [x] Skin assessment post-treatment (if applicable):   [x]  intact    []  redness- no adverse reaction     []redness - adverse reaction:    39 min Therapeutic Exercise:  [x]  See flow sheet   Rationale:      increase ROM and increase strength to improve the patients ability to perform ADL's with improved periscapular stability.       min Patient Education:  YES  Reviewed HEP   []  Progressed/Changed HEP based on:   Patient reports compliance     Other Objective/Functional Measures:    Pt had no pain with standing flexion and abduction. Pt also requires no cues to perform with correct mechanics. Initiated prone Y's without pain, but with difficulty due to weakness. Post Treatment Pain Level (on 0 to 10) scale:   0  / 10     ASSESSMENT    Assessment/Changes in Function:     Pt presents to PT preparing for DC. WIll review donning/doffing lymphedema garments and proper garment care upon next visit. []  See Progress Note/Recertification   Patient will continue to benefit from skilled PT services to modify and progress therapeutic interventions, address functional mobility deficits, address ROM deficits, address strength deficits, analyze and address soft tissue restrictions, analyze and cue movement patterns, analyze and modify body mechanics/ergonomics and assess and modify postural abnormalities to attain remaining goals. Progress toward goals / Updated goals:    Met LTG 3. Addressed LTG 4 with standing flexion/abduction.      PLAN    [x]  Upgrade activities as tolerated YES Continue plan of care   []  Discharge due to :    []  Other:      Therapist: Ino Solorzano    Date: 6/25/2018 Time: 8:02 AM     Future Appointments  Date Time Provider Maribel House   6/28/2018 1:30 PM [facility]

## 2018-06-28 ENCOUNTER — HOSPITAL ENCOUNTER (OUTPATIENT)
Dept: PHYSICAL THERAPY | Age: 78
Discharge: HOME | End: 2018-06-28
Payer: MEDICARE

## 2018-06-28 PROCEDURE — G8986 CARRY D/C STATUS: HCPCS

## 2018-06-28 PROCEDURE — 97110 THERAPEUTIC EXERCISES: CPT

## 2018-06-28 PROCEDURE — G8983 BODY POS D/C STATUS: HCPCS

## 2018-06-28 PROCEDURE — G8985 CARRY GOAL STATUS: HCPCS

## 2018-06-28 NOTE — PROGRESS NOTES
PHYSICAL THERAPY - DAILY TREATMENT NOTE    Patient Name: Hernando Cedeño        Date: 2018  : 1940   YES Patient  Verified  Visit #:     Insurance: Payor: Zander Signs / Plan: [place] & B / Product Type: Medicare /      In time: 1:30 Out time: 2:05   Total Treatment Time: 35     Medicare/BCBS Time Tracking (below)   Total Timed Codes (min):  35 1:1 Treatment Time:  35     TREATMENT AREA =  Pain in right arm [M79.601]    SUBJECTIVE    Pain Level (on 0 to 10 scale):  0  / 10   Medication Changes/New allergies or changes in medical history, any new surgeries or procedures? NO     If yes, update Summary List   Subjective Functional Status/Changes:  []  No changes reported     \"I got my sleeve. It feels fine. \"          OBJECTIVE    25 min Therapeutic Exercise:  [x]  See flow sheet   Rationale:      increase ROM and increase strength to improve the patients ability to perform ADL's with improved postural stability    10 min Self-Care: Review of proper donning/doffig of garment and proper washing protocols   Rationale: To improve skin health and prevent increased edema. min Patient Education:  YES  Reviewed HEP   []  Progressed/Changed HEP based on:   Patient reports compliance     Other Objective/Functional Measures:    See DC note. Post Treatment Pain Level (on 0 to 10) scale:   0  / 10     ASSESSMENT    Assessment/Changes in Function:     See DC note. []  See Progress Note/Recertification   Patient will continue to benefit from skilled PT services to modify and progress therapeutic interventions, address functional mobility deficits, address ROM deficits, address strength deficits, analyze and address soft tissue restrictions, analyze and cue movement patterns, analyze and modify body mechanics/ergonomics and assess and modify postural abnormalities to attain remaining goals. Progress toward goals / Updated goals:    See DC note.      PLAN    [x]  Upgrade activities as tolerated YES Continue plan of care   []  Discharge due to :    []  Other:      Therapist: Jack Batista    Date: 6/28/2018 Time: 1:38 PM     Future Appointments  Date Time Provider Maribel House   7/2/2018 8:30 AM [address] [place] [address]

## 2018-06-28 NOTE — PROGRESS NOTES
[facility] PHYSICAL THERAPY  [address] [place] [address] Fanta Toribio, [address] - Phone: [phone]  Fax: [phone]  DISCHARGE SUMMARY FOR PHYSICAL THERAPY          Patient Name: Suzanne Hernandes : 1940   Treatment/Medical Diagnosis: Pain in right arm [M79.601]   Onset Date: Mastectomy 2018    Referral Source: Pablo Zhou MD [facility] [place]): 18   Prior Hospitalization: See Medical History Provider #: [id number]   Prior Level of Function: Sedentary, Knitting, COmputer Work   Comorbidities: Hs of L/S fusion, Prior breast Cancer with Radiation , Bladder CA with Chemo , Incontinence   Medications: Verified on Patient Summary List   [facility] from [facility]: 13 Missed Visits: 0     Goal/Measure of Progress Goal Met? 1.  *Pt will be independent with HEP at D/C for self management. Status at last Eval: progressing Current Status: independent yes   2. Pt will increase FS FOTO Score to >/= 74 to indicate a significant decrease in functional disability. Status at last Eval: 71 Current Status: 98 yes   3. Pt will demo pain free overhead elevation for increased ease reaching into a cabinet. Status at last Eval: 1/10 Current Status: Pain-free yes   4. Pt will demo right shd flex MMT >/= 4+/5 without pain for increased ease performing chores at home. Status at last Eval:  Current Status: 4+/5 yes     Key Functional Changes/Progress: Pt's FOTO score improve to 98 indicating 2% limitation in functional ability. The pt reports no functional limitations and is independent in donning and doffing a compression garment ot prevent UE lymphedema. G-Codes (GP): Carry:  [id number] Goal  CJ= 20-39%  [id number] D/C  CI= 1-19%. The severity rating is based on the FOTO Score    Assessments/Recommendations: Discontinue therapy. Progressing towards or have reached established goals. If you have any questions/comments please contact us directly at [phone].    Thank you for allowing us to assist in the care of your patient. Therapist Signature: Lisa Jo DPT Date: 6/28/2018   Reporting Period: 6/11/18 - 6/28/18 Time: 7:09 PM     ===========================================================================================  NOTE TO PHYSICIAN:  PLEASE COMPLETE THE ORDERS BELOW AND FAX TO   [facility] Physical [facility]: [phone]. If you are unable to process this request in 24 hours please contact our office: [phone].    ___ I have read the above report and request that my patient continue as recommended.   ___ I have read the above report and request that my patient continue therapy with the following changes/special instructions: ________________________________________________   ___ I have read the above report and request that my patient be discharged from therapy.      Physician Signature:        Date:       Time:

## 2018-07-02 ENCOUNTER — APPOINTMENT (OUTPATIENT)
Dept: PHYSICAL THERAPY | Age: 78
End: 2018-07-02

## 2022-02-14 PROBLEM — R06.02 SOB (SHORTNESS OF BREATH): Status: ACTIVE | Noted: 2022-02-14

## 2022-02-14 PROBLEM — K21.9 GASTROESOPHAGEAL REFLUX DISEASE WITHOUT ESOPHAGITIS: Status: ACTIVE | Noted: 2018-06-13

## 2022-02-14 PROBLEM — E78.2 MIXED HYPERLIPIDEMIA: Status: ACTIVE | Noted: 2018-06-13

## 2022-02-14 PROBLEM — F32.1 CURRENT MODERATE EPISODE OF MAJOR DEPRESSIVE DISORDER WITHOUT PRIOR EPISODE (HCC): Status: ACTIVE | Noted: 2020-01-26

## 2022-02-14 PROBLEM — R06.2 WHEEZING: Status: ACTIVE | Noted: 2022-02-14

## 2022-02-14 PROBLEM — K66.0 ABDOMINAL ADHESIONS: Status: ACTIVE | Noted: 2020-01-26

## 2022-02-14 PROBLEM — C50.911: Status: ACTIVE | Noted: 2017-12-18
